# Patient Record
Sex: MALE | Race: BLACK OR AFRICAN AMERICAN | Employment: FULL TIME | URBAN - METROPOLITAN AREA
[De-identification: names, ages, dates, MRNs, and addresses within clinical notes are randomized per-mention and may not be internally consistent; named-entity substitution may affect disease eponyms.]

---

## 2021-01-05 ENCOUNTER — HOSPITAL ENCOUNTER (EMERGENCY)
Age: 56
Discharge: HOME | End: 2021-01-05
Attending: EMERGENCY MEDICINE

## 2021-01-05 ENCOUNTER — ANESTHESIA (OUTPATIENT)
Dept: SURGERY | Age: 56
End: 2021-01-05

## 2021-01-05 ENCOUNTER — APPOINTMENT (OUTPATIENT)
Dept: GENERAL RADIOLOGY | Age: 56
End: 2021-01-05
Attending: EMERGENCY MEDICINE

## 2021-01-05 ENCOUNTER — ANESTHESIA EVENT (OUTPATIENT)
Dept: SURGERY | Age: 56
End: 2021-01-05

## 2021-01-05 VITALS
DIASTOLIC BLOOD PRESSURE: 82 MMHG | OXYGEN SATURATION: 98 % | SYSTOLIC BLOOD PRESSURE: 151 MMHG | HEART RATE: 76 BPM | TEMPERATURE: 99 F | RESPIRATION RATE: 20 BRPM | BODY MASS INDEX: 40.63 KG/M2 | HEIGHT: 72 IN | WEIGHT: 300 LBS

## 2021-01-05 DIAGNOSIS — S60.551A FOREIGN BODY OF RIGHT HAND, INITIAL ENCOUNTER: Primary | ICD-10-CM

## 2021-01-05 PROBLEM — S61.441A: Status: ACTIVE | Noted: 2021-01-05

## 2021-01-05 LAB
ANION GAP SERPL CALC-SCNC: 3 MMOL/L (ref 7–16)
BASOPHILS # BLD: 0 K/UL (ref 0–0.2)
BASOPHILS NFR BLD: 0 % (ref 0–2)
BUN SERPL-MCNC: 16 MG/DL (ref 6–23)
CALCIUM SERPL-MCNC: 9.3 MG/DL (ref 8.3–10.4)
CHLORIDE SERPL-SCNC: 107 MMOL/L (ref 98–107)
CO2 SERPL-SCNC: 29 MMOL/L (ref 21–32)
CREAT SERPL-MCNC: 1.06 MG/DL (ref 0.8–1.5)
DIFFERENTIAL METHOD BLD: ABNORMAL
EOSINOPHIL # BLD: 0.1 K/UL (ref 0–0.8)
EOSINOPHIL NFR BLD: 1 % (ref 0.5–7.8)
ERYTHROCYTE [DISTWIDTH] IN BLOOD BY AUTOMATED COUNT: 14.8 % (ref 11.9–14.6)
GLUCOSE SERPL-MCNC: 117 MG/DL (ref 65–100)
HCT VFR BLD AUTO: 43.1 % (ref 41.1–50.3)
HGB BLD-MCNC: 13.7 G/DL (ref 13.6–17.2)
IMM GRANULOCYTES # BLD AUTO: 0 K/UL (ref 0–0.5)
IMM GRANULOCYTES NFR BLD AUTO: 0 % (ref 0–5)
INR PPP: 1
LYMPHOCYTES # BLD: 2.8 K/UL (ref 0.5–4.6)
LYMPHOCYTES NFR BLD: 42 % (ref 13–44)
MCH RBC QN AUTO: 27.6 PG (ref 26.1–32.9)
MCHC RBC AUTO-ENTMCNC: 31.8 G/DL (ref 31.4–35)
MCV RBC AUTO: 86.7 FL (ref 79.6–97.8)
MONOCYTES # BLD: 0.6 K/UL (ref 0.1–1.3)
MONOCYTES NFR BLD: 9 % (ref 4–12)
NEUTS SEG # BLD: 3.2 K/UL (ref 1.7–8.2)
NEUTS SEG NFR BLD: 47 % (ref 43–78)
NRBC # BLD: 0 K/UL (ref 0–0.2)
PLATELET # BLD AUTO: 261 K/UL (ref 150–450)
PMV BLD AUTO: 9.2 FL (ref 9.4–12.3)
POTASSIUM SERPL-SCNC: 4.2 MMOL/L (ref 3.5–5.1)
PROTHROMBIN TIME: 13 SEC (ref 12.5–14.7)
RBC # BLD AUTO: 4.97 M/UL (ref 4.23–5.6)
SODIUM SERPL-SCNC: 139 MMOL/L (ref 138–145)
WBC # BLD AUTO: 6.7 K/UL (ref 4.3–11.1)

## 2021-01-05 PROCEDURE — 74011250636 HC RX REV CODE- 250/636: Performed by: EMERGENCY MEDICINE

## 2021-01-05 PROCEDURE — 76210000020 HC REC RM PH II FIRST 0.5 HR: Performed by: ORTHOPAEDIC SURGERY

## 2021-01-05 PROCEDURE — 74011000250 HC RX REV CODE- 250: Performed by: NURSE ANESTHETIST, CERTIFIED REGISTERED

## 2021-01-05 PROCEDURE — 85610 PROTHROMBIN TIME: CPT

## 2021-01-05 PROCEDURE — 99283 EMERGENCY DEPT VISIT LOW MDM: CPT

## 2021-01-05 PROCEDURE — 74011250636 HC RX REV CODE- 250/636: Performed by: NURSE ANESTHETIST, CERTIFIED REGISTERED

## 2021-01-05 PROCEDURE — 96372 THER/PROPH/DIAG INJ SC/IM: CPT

## 2021-01-05 PROCEDURE — 80048 BASIC METABOLIC PNL TOTAL CA: CPT

## 2021-01-05 PROCEDURE — 74011000250 HC RX REV CODE- 250: Performed by: EMERGENCY MEDICINE

## 2021-01-05 PROCEDURE — 74011250637 HC RX REV CODE- 250/637: Performed by: ANESTHESIOLOGY

## 2021-01-05 PROCEDURE — 77030010509 HC AIRWY LMA MSK TELE -A: Performed by: ANESTHESIOLOGY

## 2021-01-05 PROCEDURE — 85025 COMPLETE CBC W/AUTO DIFF WBC: CPT

## 2021-01-05 PROCEDURE — 74011250636 HC RX REV CODE- 250/636: Performed by: ANESTHESIOLOGY

## 2021-01-05 PROCEDURE — 74011000258 HC RX REV CODE- 258: Performed by: NURSE ANESTHETIST, CERTIFIED REGISTERED

## 2021-01-05 PROCEDURE — 76010000160 HC OR TIME 0.5 TO 1 HR INTENSV-TIER 1: Performed by: ORTHOPAEDIC SURGERY

## 2021-01-05 PROCEDURE — 76210000016 HC OR PH I REC 1 TO 1.5 HR: Performed by: ORTHOPAEDIC SURGERY

## 2021-01-05 PROCEDURE — 77030017016 HC DSG ANTIMIC BARR2 S&N -B: Performed by: ORTHOPAEDIC SURGERY

## 2021-01-05 PROCEDURE — 76060000032 HC ANESTHESIA 0.5 TO 1 HR: Performed by: ORTHOPAEDIC SURGERY

## 2021-01-05 PROCEDURE — 73130 X-RAY EXAM OF HAND: CPT

## 2021-01-05 PROCEDURE — 2709999900 HC NON-CHARGEABLE SUPPLY: Performed by: ORTHOPAEDIC SURGERY

## 2021-01-05 RX ORDER — SODIUM CHLORIDE, SODIUM LACTATE, POTASSIUM CHLORIDE, CALCIUM CHLORIDE 600; 310; 30; 20 MG/100ML; MG/100ML; MG/100ML; MG/100ML
75 INJECTION, SOLUTION INTRAVENOUS CONTINUOUS
Status: DISCONTINUED | OUTPATIENT
Start: 2021-01-05 | End: 2021-01-05 | Stop reason: HOSPADM

## 2021-01-05 RX ORDER — OXYCODONE HYDROCHLORIDE 5 MG/1
5 TABLET ORAL
Status: DISCONTINUED | OUTPATIENT
Start: 2021-01-05 | End: 2021-01-05 | Stop reason: HOSPADM

## 2021-01-05 RX ORDER — NALOXONE HYDROCHLORIDE 0.4 MG/ML
0.1 INJECTION, SOLUTION INTRAMUSCULAR; INTRAVENOUS; SUBCUTANEOUS
Status: DISCONTINUED | OUTPATIENT
Start: 2021-01-05 | End: 2021-01-05 | Stop reason: HOSPADM

## 2021-01-05 RX ORDER — DIPHENHYDRAMINE HYDROCHLORIDE 50 MG/ML
12.5 INJECTION, SOLUTION INTRAMUSCULAR; INTRAVENOUS
Status: DISCONTINUED | OUTPATIENT
Start: 2021-01-05 | End: 2021-01-05 | Stop reason: HOSPADM

## 2021-01-05 RX ORDER — FENTANYL CITRATE 50 UG/ML
100 INJECTION, SOLUTION INTRAMUSCULAR; INTRAVENOUS ONCE
Status: DISCONTINUED | OUTPATIENT
Start: 2021-01-05 | End: 2021-01-05 | Stop reason: HOSPADM

## 2021-01-05 RX ORDER — LIDOCAINE HYDROCHLORIDE 20 MG/ML
INJECTION, SOLUTION EPIDURAL; INFILTRATION; INTRACAUDAL; PERINEURAL AS NEEDED
Status: DISCONTINUED | OUTPATIENT
Start: 2021-01-05 | End: 2021-01-05 | Stop reason: HOSPADM

## 2021-01-05 RX ORDER — FENTANYL CITRATE 50 UG/ML
INJECTION, SOLUTION INTRAMUSCULAR; INTRAVENOUS AS NEEDED
Status: DISCONTINUED | OUTPATIENT
Start: 2021-01-05 | End: 2021-01-05 | Stop reason: HOSPADM

## 2021-01-05 RX ORDER — HYDROMORPHONE HYDROCHLORIDE 2 MG/ML
0.5 INJECTION, SOLUTION INTRAMUSCULAR; INTRAVENOUS; SUBCUTANEOUS
Status: DISCONTINUED | OUTPATIENT
Start: 2021-01-05 | End: 2021-01-05 | Stop reason: HOSPADM

## 2021-01-05 RX ORDER — HYDROCODONE BITARTRATE AND ACETAMINOPHEN 5; 325 MG/1; MG/1
2 TABLET ORAL
Qty: 40 TAB | Refills: 0 | Status: SHIPPED | OUTPATIENT
Start: 2021-01-05 | End: 2021-01-10

## 2021-01-05 RX ORDER — CEFAZOLIN SODIUM 1 G/3ML
1 INJECTION, POWDER, FOR SOLUTION INTRAMUSCULAR; INTRAVENOUS
Status: DISCONTINUED | OUTPATIENT
Start: 2021-01-05 | End: 2021-01-05

## 2021-01-05 RX ORDER — GLYCOPYRROLATE 0.2 MG/ML
INJECTION INTRAMUSCULAR; INTRAVENOUS AS NEEDED
Status: DISCONTINUED | OUTPATIENT
Start: 2021-01-05 | End: 2021-01-05 | Stop reason: HOSPADM

## 2021-01-05 RX ORDER — FLUMAZENIL 0.1 MG/ML
0.2 INJECTION INTRAVENOUS AS NEEDED
Status: DISCONTINUED | OUTPATIENT
Start: 2021-01-05 | End: 2021-01-05 | Stop reason: HOSPADM

## 2021-01-05 RX ORDER — LIDOCAINE HYDROCHLORIDE 10 MG/ML
0.1 INJECTION INFILTRATION; PERINEURAL AS NEEDED
Status: DISCONTINUED | OUTPATIENT
Start: 2021-01-05 | End: 2021-01-05 | Stop reason: HOSPADM

## 2021-01-05 RX ORDER — OXYCODONE HYDROCHLORIDE 5 MG/1
10 TABLET ORAL
Status: COMPLETED | OUTPATIENT
Start: 2021-01-05 | End: 2021-01-05

## 2021-01-05 RX ORDER — PROPOFOL 10 MG/ML
INJECTION, EMULSION INTRAVENOUS AS NEEDED
Status: DISCONTINUED | OUTPATIENT
Start: 2021-01-05 | End: 2021-01-05 | Stop reason: HOSPADM

## 2021-01-05 RX ORDER — MIDAZOLAM HYDROCHLORIDE 1 MG/ML
2 INJECTION, SOLUTION INTRAMUSCULAR; INTRAVENOUS
Status: DISCONTINUED | OUTPATIENT
Start: 2021-01-05 | End: 2021-01-05 | Stop reason: HOSPADM

## 2021-01-05 RX ORDER — AMOXICILLIN AND CLAVULANATE POTASSIUM 875; 125 MG/1; MG/1
1 TABLET, FILM COATED ORAL 2 TIMES DAILY
Qty: 14 TAB | Refills: 1 | Status: SHIPPED | OUTPATIENT
Start: 2021-01-05

## 2021-01-05 RX ORDER — MIDAZOLAM HYDROCHLORIDE 1 MG/ML
2 INJECTION, SOLUTION INTRAMUSCULAR; INTRAVENOUS ONCE
Status: DISCONTINUED | OUTPATIENT
Start: 2021-01-05 | End: 2021-01-05 | Stop reason: HOSPADM

## 2021-01-05 RX ORDER — ONDANSETRON 2 MG/ML
INJECTION INTRAMUSCULAR; INTRAVENOUS AS NEEDED
Status: DISCONTINUED | OUTPATIENT
Start: 2021-01-05 | End: 2021-01-05 | Stop reason: HOSPADM

## 2021-01-05 RX ADMIN — GLYCOPYRROLATE 0.1 MG: 0.2 INJECTION, SOLUTION INTRAMUSCULAR; INTRAVENOUS at 15:20

## 2021-01-05 RX ADMIN — OXYCODONE 10 MG: 5 TABLET ORAL at 16:21

## 2021-01-05 RX ADMIN — PROPOFOL 50 MG: 10 INJECTION, EMULSION INTRAVENOUS at 15:12

## 2021-01-05 RX ADMIN — CEFAZOLIN 1 G: 1 INJECTION, POWDER, FOR SOLUTION INTRAMUSCULAR; INTRAVENOUS; PARENTERAL at 15:16

## 2021-01-05 RX ADMIN — PROPOFOL 350 MG: 10 INJECTION, EMULSION INTRAVENOUS at 15:11

## 2021-01-05 RX ADMIN — SODIUM CHLORIDE, SODIUM LACTATE, POTASSIUM CHLORIDE, AND CALCIUM CHLORIDE: 600; 310; 30; 20 INJECTION, SOLUTION INTRAVENOUS at 15:04

## 2021-01-05 RX ADMIN — FENTANYL CITRATE 50 MCG: 50 INJECTION INTRAMUSCULAR; INTRAVENOUS at 15:22

## 2021-01-05 RX ADMIN — ONDANSETRON 4 MG: 2 INJECTION INTRAMUSCULAR; INTRAVENOUS at 15:22

## 2021-01-05 RX ADMIN — CEFAZOLIN 1000 MG: 1 INJECTION, POWDER, FOR SOLUTION INTRAMUSCULAR; INTRAVENOUS at 09:08

## 2021-01-05 RX ADMIN — HYDROMORPHONE HYDROCHLORIDE 0.5 MG: 2 INJECTION INTRAMUSCULAR; INTRAVENOUS; SUBCUTANEOUS at 16:16

## 2021-01-05 RX ADMIN — LIDOCAINE HYDROCHLORIDE 100 MG: 20 INJECTION, SOLUTION EPIDURAL; INFILTRATION; INTRACAUDAL; PERINEURAL at 15:11

## 2021-01-05 NOTE — DISCHARGE INSTRUCTIONS
Activity:       Restrict lifting. No lifting with right hand until follow-up appointment. Start slowly with soft, bland foods. May advance to regular diet as tolerated. Remove dressing in 72 hours (Friday afternoon). May replace with dry gauze dressing. May shower at that time. ---------------    After general anesthesia or intravenous sedation, for 24 hours or while taking prescription Narcotics:  · Limit your activities  · A responsible adult needs to be with you for the next 24 hours  · Do not drive and operate hazardous machinery  · Do not make important personal or business decisions  · Do not drink alcoholic beverages  · If you have not urinated within 8 hours after discharge, and you are experiencing discomfort from urinary retention, please go to the nearest ED. · If you have sleep apnea and have a CPAP machine, please use it for all naps and sleeping. · Please use caution when taking narcotics and any of your home medications that may cause drowsiness. *  Please give a list of your current medications to your Primary Care Provider. *  Please update this list whenever your medications are discontinued, doses are      changed, or new medications (including over-the-counter products) are added. *  Please carry medication information at all times in case of emergency situations. These are general instructions for a healthy lifestyle:  No smoking/ No tobacco products/ Avoid exposure to second hand smoke  Surgeon General's Warning:  Quitting smoking now greatly reduces serious risk to your health.   Obesity, smoking, and sedentary lifestyle greatly increases your risk for illness  A healthy diet, regular physical exercise & weight monitoring are important for maintaining a healthy lifestyle    You may be retaining fluid if you have a history of heart failure or if you experience any of the following symptoms:  Weight gain of 3 pounds or more overnight or 5 pounds in a week, increased swelling in our hands or feet or shortness of breath while lying flat in bed. Please call your doctor as soon as you notice any of these symptoms; do not wait until your next office visit.

## 2021-01-05 NOTE — INTERVAL H&P NOTE
Update History & Physical 
 
The Patient's History and Physical of  
1/2/2021 was reviewed with the patient and I examined the patient. There was no change. The surgical site was confirmed by the patient and me. Plan:  The risk, benefits, expected outcome, and alternative to the recommended procedure have been discussed with the patient. Patient understands and wants to proceed with removal of foreign body right hand.  
 
Electronically signed by Alyssa Onofre MD on 1/5/2021 at 2:34 PM

## 2021-01-05 NOTE — PERIOP NOTES
Spoke with Delores, patient's ride, on the cell phone. She is anticipating arrival around [id number].

## 2021-01-05 NOTE — H&P (VIEW-ONLY)
[facility] Consultation Note Patient ID: 
Name: Heide Owens MRN: [id number] AGE: 48 y.o. 
: 1965 Date of Consultation:  2021 Referring Physician:  Emergency Dept. Subjective: Pt complains of right hand injury. He was at work when the injury occurred. An industrial knitting needle pierced his right hand and has been stuck. This occurred around 4AM. He has no other complaints. He reports a history of Diabetes. He is primarily Left hand dominant. He has sensation in all fingers. Past Medical History Includes:  
Past Medical History:  
Diagnosis Date  Diabetes ([address]   
,  
Past Surgical History:  
Procedure Laterality Date  HX ORTHOPAEDIC    
 KNEE 30YRS AGO Family History: No family history on file. Social History:  
Social History Tobacco Use  Smoking status: Former Smoker Substance Use Topics  Alcohol use: No  
 
 
ALLERGIES: No Known Allergies Patient Medications Current Facility-Administered Medications Medication Dose Route Frequency  ceFAZolin (ANCEF) 1,000 mg in sterile water (preservative free) injection  1,000 mg IntraMUSCular ONCE Current Outpatient Medications Medication Sig  
 atorvastatin (LIPITOR) 80 mg tablet Take 80 mg by mouth daily.  lisinopril-hydroCHLOROthiazide (PRINZIDE, ZESTORETIC) 10-12.5 mg per tablet Take  by mouth daily.  metFORMIN (GLUMETZA ER) 500 mg TG24 24 hour tablet Take  by mouth.  omeprazole (PRILOSEC) 20 mg capsule Take 20 mg by mouth daily.  amLODIPine-benazepril (LOTREL) 10-20 mg per capsule Take 1 Cap by mouth daily.  loratadine 10 mg cap Take  by mouth.  fluticasone (FLOVENT HFA) 110 mcg/actuation inhaler Take  by inhalation.  cholecalciferol, vitamin D3, (VITAMIN D3) 2,000 unit tab Take  by mouth.  cyclobenzaprine (FLEXERIL) 10 mg tablet Take  by mouth three (3) times daily as needed for Muscle Spasm(s). Review of Systems:  A comprehensive review of systems was negative except for that written in the HPI. Physical Exam:  
  
General: NAD, Alert, Oriented x 3 Mental Status: Appropriate Psych: Normal Affect, Normal Mood HEENT: Normal Cephalic/Atraumatic, PERRL Lungs: Respirations even and unlabored, Breath Sounds were clear, no respiratory distress Heart: Regular Rate and Rhythm Vascular: Distal pulses intact, good capillary refill Skin: No redness, No Rashes, Skin is dry Musculoskeletal: exam of the right hand demonstrate visible dark grease on the hand. There is a long industrial size knitting needle through the palmar aspect of the right hand near the 1st metacarpal. NV intact in the digits. No active bleeding Lymphatic: No lympahdenopathy, No distal edema Neuro: No gross deficits Abdomen: Soft, Non tender, No distension VITALS:  
Patient Vitals for the past 8 hrs: 
 BP Temp Pulse Resp SpO2 Height Weight 21 0803 (!) 157/96 97.9 °F (36.6 °C) 70 15 94 %    
21 0711    20  6' (1.829 m) 136.1 kg (300 lb) , Temp (24hrs), Av.9 °F (36.6 °C), Min:97.9 °F (36.6 °C), Max:97.9 °F (36.6 °C) X-ray: reviewed on EMR Diagnosis Patient Active Problem List  
Diagnosis Code  Puncture wound of right hand with foreign body S61.441A Assessment and Plan:  
 
Right Hand foreign body: The patient was discussed with Dr. Oleg Hanks. I have spoken with the patient and feel that this is best removed in the OR. We will proceed with surgery for removal of foreign body in the OR today. Keep NPO.   
 
SHARDA Boland 
2021,  9:01 AM

## 2021-01-05 NOTE — OP NOTES
Operative Report    Patient: Liz Hough MRN: [id number]  SSN: [id number]    YOB: 1965  Age: 48 y.o. Sex: male       Date of Surgery: 1/5/2021     History:  Liz Hough is a 48 y.o. male who had a foreign body penetrate his right hand at work. 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  He was brought to the emergency room. X-rays there revealed a needle with a small heladio on the end it did appear. For that reason we thought it was reasonable to taken to the operating room to remove this to make sure that we could do this atraumatically as well as the fact that he does have diabetes so we thought be important to try to make sure we debrided this and wash this out to minimize his risk of infection. I talked to him about this as well as his coworker they seem to feel comfortable consenting      I talked to the patient and/or their representative and explained the exact nature the procedure. I also went through a detailed list of the material risks associated with  the procedure which included risk of bleeding, infection, injury to nearby structures, worsening the situation, as well as the risks associate with anesthesia and finally death. Also talked with him regarding the benefits and alternatives to the procedure. Preoperative Diagnosis: Foreign body of right hand, initial encounter [N56.382W]     Postoperative Diagnosis: Foreign body of right hand, initial encounter Susan Kaplan     Surgeon(s) and Role:     * Zena Johnson MD - Primary    Anesthesia: General     Procedure: Procedure(s):  FOREIGN BODY REMOVAL RIGHT HAND     Procedure in Detail: After the successful induction of general anesthetic the right upper extremity was prepped and during the procedure of prepping for the hand was completely prepped I did very gently remove the needle from the subcutaneous tissue of the right hand.   Second do this very atraumatically I thought it was reasonable. We then finished prepping and draping the right hand. I then made a small chevron incision over the area where the needle was and dissected down through the subcutaneous tissue toward the area of the tendon sheath. The wound do not appear to penetrate the tendon sheath and there appeared to be no evidence of any damage to soft tissue structures. I then thoroughly irrigated the wound and closed the wound with 3 interrupted 4-0 nylon sutures. Dressings were then applied. The patient was awakened and taken cover him in stable condition      Estimated Blood Loss: 10 cc    Tourniquet Time: * No tourniquets in log *      Implants: * No implants in log *            Specimens: * No specimens in log *        Drains: None                Complications: None    Counts: Sponge and needle counts were correct times two.     Signed By:  Jose Enrique Lewis MD     January 5, 2021

## 2021-01-05 NOTE — ANESTHESIA POSTPROCEDURE EVALUATION
Procedure(s):  FOREIGN BODY REMOVAL RIGHT HAND. general    Anesthesia Post Evaluation      Multimodal analgesia: multimodal analgesia not used between 6 hours prior to anesthesia start to PACU discharge  Patient location during evaluation: PACU  Patient participation: complete - patient participated  Level of consciousness: awake and alert  Pain management: adequate  Airway patency: patent  Anesthetic complications: no  Cardiovascular status: hemodynamically stable  Respiratory status: acceptable  Hydration status: acceptable        INITIAL Post-op Vital signs:   Vitals Value Taken Time   /80 01/05/21 1647   Temp 37.3 °C (99.1 °F) 01/05/21 1544   Pulse 63 01/05/21 1657   Resp 18 01/05/21 1630   SpO2 100 % 01/05/21 1657   Vitals shown include unvalidated device data.

## 2021-01-05 NOTE — CONSULTS
[facility]  Consultation Note    Patient ID:  Name: Bill Palacios  MRN: [id number]  AGE: 48 y.o.  : 1965    Date of Consultation:  2021  Referring Physician:  Emergency Dept. Subjective: Pt complains of right hand injury. He was at work when the injury occurred. An industrial knitting needle pierced his right hand and has been stuck. This occurred around 4AM. He has no other complaints. He reports a history of Diabetes. He is primarily Left hand dominant. He has sensation in all fingers. Past Medical History Includes:   Past Medical History:   Diagnosis Date    Diabetes ([address]    ,   Past Surgical History:   Procedure Laterality Date    HX ORTHOPAEDIC      KNEE 30YRS AGO     Family History: No family history on file. Social History:   Social History     Tobacco Use    Smoking status: Former Smoker   Substance Use Topics    Alcohol use: No       ALLERGIES: No Known Allergies     Patient Medications    Current Facility-Administered Medications   Medication Dose Route Frequency    ceFAZolin (ANCEF) 1,000 mg in sterile water (preservative free) injection  1,000 mg IntraMUSCular ONCE     Current Outpatient Medications   Medication Sig    atorvastatin (LIPITOR) 80 mg tablet Take 80 mg by mouth daily.  lisinopril-hydroCHLOROthiazide (PRINZIDE, ZESTORETIC) 10-12.5 mg per tablet Take  by mouth daily.  metFORMIN (GLUMETZA ER) 500 mg TG24 24 hour tablet Take  by mouth.  omeprazole (PRILOSEC) 20 mg capsule Take 20 mg by mouth daily.  amLODIPine-benazepril (LOTREL) 10-20 mg per capsule Take 1 Cap by mouth daily.  loratadine 10 mg cap Take  by mouth.  fluticasone (FLOVENT HFA) 110 mcg/actuation inhaler Take  by inhalation.  cholecalciferol, vitamin D3, (VITAMIN D3) 2,000 unit tab Take  by mouth.  cyclobenzaprine (FLEXERIL) 10 mg tablet Take  by mouth three (3) times daily as needed for Muscle Spasm(s). Review of Systems:  A comprehensive review of systems was negative except for that written in the HPI. Physical Exam:      General: NAD, Alert, Oriented x 3   Mental Status: Appropriate   Psych: Normal Affect, Normal Mood    HEENT: Normal Cephalic/Atraumatic, PERRL   Lungs: Respirations even and unlabored, Breath Sounds were clear, no respiratory distress   Heart: Regular Rate and Rhythm   Vascular: Distal pulses intact, good capillary refill   Skin: No redness, No Rashes, Skin is dry   Musculoskeletal: exam of the right hand demonstrate visible dark grease on the hand. There is a long industrial size knitting needle through the palmar aspect of the right hand near the 1st metacarpal. NV intact in the digits. No active bleeding  Lymphatic: No lympahdenopathy, No distal edema   Neuro: No gross deficits   Abdomen: Soft, Non tender, No distension      VITALS:   Patient Vitals for the past 8 hrs:   BP Temp Pulse Resp SpO2 Height Weight   21 0803 (!) 157/96 97.9 °F (36.6 °C) 70 15 94 %     21 0711    20  6' (1.829 m) 136.1 kg (300 lb)    , Temp (24hrs), Av.9 °F (36.6 °C), Min:97.9 °F (36.6 °C), Max:97.9 °F (36.6 °C)         X-ray: reviewed on EMR     Diagnosis   Patient Active Problem List   Diagnosis Code    Puncture wound of right hand with foreign body S61.441A          Assessment and Plan:     Right Hand foreign body: The patient was discussed with Dr. Radha Estevez. I have spoken with the patient and feel that this is best removed in the OR. We will proceed with surgery for removal of foreign body in the OR today. Keep NPO.      SHARDA Sales  2021,  9:01 AM

## 2021-01-05 NOTE — PERIOP NOTES
[id number]: Discharge instructions reviewed with patient and spouse, Eliud Cruzers, once patient was transported to the car. Both verbalized understanding. Questions answered. No concerns voiced. Paperwork/prescriptions with Herbe Showers. Patient has all of his belongings. Unable to obtain signature as spouse wanted to stay in her car.

## 2021-01-05 NOTE — ED TRIAGE NOTES
Patient ambulatory to triage with mask in place. Patient reports knitters hook stuck in his right hand.

## 2021-01-05 NOTE — ED PROVIDER NOTES
Patient is a 81-year-old male presenting to the emergencydepartment today secondary to a foreign body in the right hand. The patient works at a knitting factory and got the Espinoza Stroud needle stuck in the palm of his hand. The patient denies any loss of sensation in the right hand but has decreased movement secondary to pain. Patient says he is ambidextrous but primarily left-hand-dominant. Past Medical History:   Diagnosis Date    Diabetes [facility])        Past Surgical History:   Procedure Laterality Date    HX ORTHOPAEDIC      KNEE 30YRS AGO         No family history on file.     Social History     Socioeconomic History    Marital status:      Spouse name: Not on file    Number of children: Not on file    Years of education: Not on file    Highest education level: Not on file   Occupational History    Not on file   Social Needs    Financial resource strain: Not on file    Food insecurity     Worry: Not on file     Inability: Not on file    Transportation needs     Medical: Not on file     Non-medical: Not on file   Tobacco Use    Smoking status: Former Smoker   Substance and Sexual Activity    Alcohol use: No    Drug use: No    Sexual activity: Not on file   Lifestyle    Physical activity     Days per week: Not on file     Minutes per session: Not on file    Stress: Not on file   Relationships    Social connections     Talks on phone: Not on file     Gets together: Not on file     Attends [employer] service: Not on file     Active member of club or organization: Not on file     Attends meetings of clubs or organizations: Not on file     Relationship status: Not on file    Intimate partner violence     Fear of current or ex partner: Not on file     Emotionally abused: Not on file     Physically abused: Not on file     Forced sexual activity: Not on file   Other Topics Concern    Not on file   Social History Narrative    Not on file         ALLERGIES: Patient has no known allergies. Review of Systems   Constitutional: Negative. Musculoskeletal:        FB   Skin: Negative. Neurological: Negative. Hematological: Negative. Vitals:    01/05/21 0711 01/05/21 0803   BP:  (!) 157/96   Pulse:  70   Resp: 20 15   Temp:  97.9 °F (36.6 °C)   SpO2:  94%   Weight: 136.1 kg (300 lb)    Height: 6' (1.829 m)             Physical Exam     GENERAL:The patient has Body mass index is 40.69 kg/m². Well-hydrated. No acute distress. VITAL SIGNS: Heart rate, blood pressure, respiratory rate reviewed as recorded in  nurse's notes  EYES: Pupils reactive. Extraocular motion intact. No conjunctival redness or drainage. LUNGS: No accessory muscle use  CARDIOVASCULAR: Regular rate and rhythm  EXTREMITIES: Patient has a knitting needle sticking out of the right palm of the hand. No decreased sensation noted in the right hand compared to left. NEUROLOGIC: Cranial nerve exam reveals face is symmetrical, tongue is midline speech is clear. No focal deficits noted  SKIN: No rash or petechiae. Good skin turgor palpated. PSYCHIATRIC: Alert and oriented. Appropriate behavior and judgment. MDM  Number of Diagnoses or Management Options  Diagnosis management comments: Neurovascular injury, tendon injury, foreign body,       Amount and/or Complexity of Data Reviewed  Tests in the radiology section of CPT®: ordered and reviewed  Independent visualization of images, tracings, or specimens: yes      ED Course as of Jan 05 0932   Leatha Mahendra Jan 05, 2021   3316 I spoke to orthopedic surgery and they are going to reach out to the hand specialist regarding removal of the foreign body. Alber Cortez is going to come to the emergency department and evaluate the patient hand to see if the foreign body can be removed here needs to be done in the operating room.     [KH]   1809 IMPRESSION:  Linear metallic foreign body with barbed end present within the  palmar soft tissues anterior to the distal third metacarpal. Additional punctate  metallic foreign body posterior to the distal phalanx of the fourth digit. No  acute fracture. XR HAND RT MIN 3 V [KH]   3058 After orthopedic surgery saw the patient they did decide that they are going to take him to the operating room to remove the hook and do a good washout. The patient was given Ancef here in the emergency department.     [KH]      ED Course User Index  [KH] Michelle Nagel,        Procedures

## 2021-01-05 NOTE — ED NOTES
TRANSFER - OUT REPORT:    Verbal report given to Ronaldo Bhatti on Linette Sers  being transferred to preop for ordered procedure       Report consisted of patients Situation, Background, Assessment and   Recommendations(SBAR). Information from the following report(s) SBAR, Kardex, ED Summary and OR Summary was reviewed with the receiving nurse. Lines:   Peripheral IV 01/05/21 Left; Outer; Lower Arm (Active)        Opportunity for questions and clarification was provided.       Patient transported with:   [employer]

## 2021-01-05 NOTE — PERIOP NOTES
TRANSFER - IN REPORT:    Verbal report received from NATALIYA Ortiz(name) on [place]  being received from ER(unit) for routine progression of care      Report consisted of patients Situation, Background, Assessment and   Recommendations(SBAR). Information from the following report(s) Kardex, MAR and Recent Results was reviewed with the receiving nurse. Opportunity for questions and clarification was provided. Assessment completed upon patients arrival to unit and care assumed.

## 2022-03-19 PROBLEM — S61.441A: Status: ACTIVE | Noted: 2021-01-05

## 2023-10-25 DIAGNOSIS — J98.4 PNEUMONITIS: Primary | ICD-10-CM

## (undated) DEVICE — 7 DAY SILVER-COATED ANTIMICROBIAL BARRIER DRESSING: Brand: ACTICOAT 7  4" X 5"

## (undated) DEVICE — GAUZE,SPONGE,4"X4",16PLY,STRL,LF,10/TRAY: Brand: MEDLINE

## (undated) DEVICE — PADDING CAST COHESIVE 4 YDX3 IN HND TEARABLE COTTON SPEC 100

## (undated) DEVICE — TRNQT RMFG CUFF 2P 18IN W/SLV --

## (undated) DEVICE — BNDG,ELSTC,MATRIX,STRL,4"X5YD,LF,HOOK&LP: Brand: MEDLINE

## (undated) DEVICE — ADAPTER CIRC OD22XID15MM FOR MON VENT PARAMETER

## (undated) DEVICE — DRAPE,HAND,STERILE: Brand: MEDLINE

## (undated) DEVICE — UPPER EXTREMITY: Brand: MEDLINE INDUSTRIES, INC.